# Patient Record
(demographics unavailable — no encounter records)

---

## 2022-08-01 LAB
ALBUMIN SERPL-MCNC: 3.7 G/DL (ref 3.5–5.2)
ALBUMIN/GLOB SERPL: 1 {RATIO} (ref 1–2.7)
ALP SERPL-CCNC: 96 UNIT/L (ref 40–130)
ALT SERPL-CCNC: 53 UNIT/L (ref 0–50)
ANION GAP SERPL CALC-SCNC: 12 MMOL/L (ref 2–17)
AST SERPL-CCNC: 35 UNIT/L (ref 0–50)
BASOPHILS # BLD AUTO: 0.1 X10E3/MCL (ref 0–0.2)
BASOPHILS NFR BLD AUTO: 0.7 % (ref 0–2)
BILIRUB SERPL-MCNC: 0.3 MG/DL (ref 0–1.2)
BUN SERPL-MCNC: 6 MG/DL (ref 6–20)
CALCIUM SERPL-MCNC: 9.2 MG/DL (ref 8.6–10)
CHLORIDE SERPL-SCNC: 89 MMOL/L (ref 98–107)
CREAT SERPL-MCNC: 0.6 MG/DL (ref 0.7–1.3)
DEPRECATED HCO3 PLAS-SCNC: 28 MMOL/L (ref 22–29)
EOSINOPHIL # BLD AUTO: 0.2 X10E3/MCL (ref 0–0.5)
EOSINOPHIL NFR BLD AUTO: 2 % (ref 0–7)
ERYTHROCYTE [DISTWIDTH] IN BLOOD BY AUTOMATED COUNT: 14.4 % (ref 11–16)
GFR SERPL CREATININE-BSD FRML MDRD: 117 ML/MIN/1.73M²
GLOBULIN SER CALC-MCNC: 3.4 G/DL (ref 1.9–4.4)
GLUCOSE SERPL-MCNC: 93 MG/DL (ref 70–99)
HCT VFR BLD AUTO: 36.4 % (ref 38–52)
HGB BLD-MCNC: 12.1 G/DL (ref 13–17.3)
IMMATURE GRANS (ABS): 0.03 X10E3/MCL (ref 0–0.06)
IMMATURE GRANULOCYTES: 0.3 % (ref 0–0.6)
LYMPHOCYTES # SPEC AUTO: 2.6 X10E3/MCL (ref 1–3.2)
LYMPHOCYTES NFR BLD AUTO: 24.4 % (ref 15–45)
MAGNESIUM SERPL-MCNC: 1.8 MG/DL (ref 1.6–2.6)
MCH RBC QN AUTO: 29.4 PG (ref 27–34.5)
MCHC RBC AUTO-ENTMCNC: 33.2 G/DL (ref 32–36)
MCV RBC AUTO: 88.3 FL (ref 84–100)
MONOCYTES # BLD AUTO: 0.6 X10E3/MCL (ref 0.3–1)
MONOCYTES NFR BLD AUTO: 5.8 % (ref 4–12)
NEUTROPHILS # BLD AUTO: 7.2 X10E3/MCL (ref 1.6–7.3)
NEUTROPHILS NFR BLD AUTO: 66.8 % (ref 42–74)
OSMOLALITY SERPL CALC.SUM OF ELEC: 258 MOSM/KG (ref 270–287)
PLATELET # BLD AUTO: 371 X10E3/MCL (ref 140–440)
PMV BLD AUTO: 9.5 FL (ref 7.2–13.2)
POTASSIUM SERPL-SCNC: 3.7 MMOL/L (ref 3.5–5.3)
PROT SERPL-MCNC: 7.2 G/DL (ref 6.4–8.3)
RBC # BLD AUTO: 4.12 X10E6/MCL (ref 4–5.6)
SODIUM SERPL-SCNC: 130 MMOL/L (ref 135–145)
T4 FREE SERPL-MCNC: 0.75 NG/DL (ref 0.82–1.7)
TSH SERPL DL<=0.05 MIU/L-ACNC: 26.06 MCIU/ML (ref 0.36–3.74)
WBC # BLD AUTO: 10.8 X10E3/MCL (ref 3.8–10.6)

## 2022-10-21 NOTE — DISCHARGE SUMMARY
Blood, Stat, ST - Stat, 08/01/22 19:50:00 EDT, 08/01/22 19:50:00 EDT, Nurse Trang dale, Print label Y/N   CMP Completed Blood, Stat, ST - Stat, 08/01/22 19:50:00 EDT, 08/01/22 19:50:00 EDT, Nurse Trang dale, Print label Y/N   Mg Completed Blood, Stat, ST - Stat, 08/01/22 19:50:00 EDT, 08/01/22 19:50:00 EDT, Nurse Trang dale, Print label Y/N   T4 Free Completed Blood, Stat, ST - Stat, 08/01/22 19:50:00 EDT, 08/01/22 19:50:00 EDT, Nurse Trang dale, Print label Y/N   TSH Completed Blood, Stat, ST - Stat, 08/01/22 19:50:00 EDT, 08/01/22 19:50:00 EDT, Trang Greene, Print label Y/N               Radiology Orders  No radiology orders were placed.               Patient Care Orders  Name Status Details   Discharge Patient Ordered 08/01/22 21:21:00 EDT   ED Assessment Adult Completed 08/01/22 18:55:38 EDT, 08/01/22 18:55:38 EDT   ED Secondary Triage Completed 08/01/22 18:55:38 EDT, 08/01/22 18:55:38 EDT   ED Triage Adult Completed 08/01/22 18:25:29 EDT, 08/01/22 18:25:29 EDT   Saline Lock Insert Completed 08/01/22 19:50:00 EDT, Once, 08/01/22 19:50:00 EDT             PROVIDER INFORMATION               Provider Role Assigned Ameya WILSON ED Provider 8/1/2022 19:47:20    Netta Cruz, RN, Paty Lang ED Nurse 8/1/2022 20:03:11        Attending Physician:  Trang WILSON     Admit Doc  DARIUSMAREK     Consulting Doc       VITALS INFORMATION  Vital Sign Triage Latest   Temp Oral ORAL_1%>36.5 degC ORAL%>36.5 degC   Temp Temporal TEMPORAL_1%> TEMPORAL%>   Temp Intravascular INTRAVASCULAR_1%> INTRAVASCULAR%>   Temp Axillary AXILLARY_1%> AXILLARY%>   Temp Rectal RECTAL_1%> RECTAL%>   02 Sat 98 % 98 %   Respiratory Rate RATE_1%>18 br/min RATE%>18 br/min   Peripheral Pulse Rate PULSE RATE_1%> PULSE RATE%>   Apical Heart Rate HEART RATE_1%> HEART RATE%>   Blood Pressure BLOOD PRESSURE_1%>/ BLOOD PRESSURE_1%>86 mmHg BLOOD PRESSURE%>126 mmHg / BLOOD PRESSURE%>86 mmHg                 Immunizations      No Immunizations Documented This Visit          DISCHARGE INFORMATION   Discharge Disposition: H Outpt-Sent Home   Discharge Location:    Home   Discharge Date and Time:    2022 21:59:27   ED Checkout Date and Time:    2022 21:59:27     DEPART REASON INCOMPLETE INFORMATION               Depart Action Incomplete Reason   Interactive View/I&O Recently assessed               Problems      Active           Brain metastasis          Lung mass              Smoking Status      10 or more cigarettes (1/2 pack or more)/day in last 30 days         PATIENT EDUCATION INFORMATION  Instructions:       Hypothyroidism; Hyponatremia     Follow up:                    With: Address: When:   All your oncologist tomorrow concerning your low thyroid level. Return for fevers chills inability take fluids or any concerns. ED PROVIDER DOCUMENTATION     Patient:   Renu Zhao             MRN: [id number]            FIN: [id number]               Age:   46 years     Sex:  Male     :  1971   Associated Diagnoses:   Hyponatremia; Hypothyroidism   Author:   Norma WILSON      Basic Information   Time seen: Provider Seen (ST)   ED Provider/Time:    Norma WILSON / 2022 19:47  . Additional information: Chief Complaint from Nursing Triage Note   Chief Complaint  Chief Complaint: Pt c/o nausea, diarrhea, weakness, headache and on chemotherapy. Pt rpts high thyroid numbers due to treatment before and feels the same way now. (22 18:52:00). History of Present Illness   The patient presents with nausea and 77-year-old male history of metastatic lung cancer mets to the brain he is getting [facility] presents here because of generalized malaise today does not feeling well no energy some nausea.   Concerned that his thyroid might be acting up other than the past.  No vomiting but and he has had tablet: 20 mg, 1 tabs, Oral, Daily, 0 Refill(s). Past Medical/ Family/ Social History   Medical history: Reviewed as documented in chart. Surgical history: Reviewed as documented in chart. Family history: Not significant. Social history: Reviewed as documented in chart. Problem list:    Active Problems (3)  Brain metastasis   Lung mass   Seizure   . Physical Examination               Vital Signs   Vital Signs   7/3/0438 30:56 EDT Systolic Blood Pressure 618 mmHg    Diastolic Blood Pressure 86 mmHg    Temperature Oral 36.5 degC    Heart Rate Monitored 64 bpm    Respiratory Rate 18 br/min    SpO2 98 %   . Measurements   8/1/2022 18:55 EDT Body Mass Index est madiha 26.70 kg/m2    Body Mass Index Measured 26.70 kg/m2   8/1/2022 18:52 EDT Height/Length Measured 172 cm    Weight Dosing 79 kg   . Basic Oxygen Information   8/1/2022 20:11 EDT Oxygen Therapy Room air   8/1/2022 18:52 EDT Oxygen Therapy Room air    SpO2 98 %   . General:  Alert, no acute distress. Skin:  Warm. Head:  Normocephalic. Neck:  Trachea midline. Eye:  Normal conjunctiva. Ears, nose, mouth and throat:  Oral mucosa moist.   Cardiovascular:  Regular rate and rhythm. Respiratory:  Lungs are clear to auscultation, respirations are non-labored, breath sounds are equal.    Gastrointestinal:  Soft, Nontender, Non distended. Neurological:  Alert and oriented to person, place, time, and situation, No focal neurological deficit observed. Lymphatics:  No lymphadenopathy. Psychiatric:  Cooperative, appropriate mood & affect. Medical Decision Making   Rationale:  Past medical and surgical history reviewed by me and also meds and allergies. Documents reviewed:  Emergency department nurses' notes.    Results review:  Lab results : Lab View   8/1/2022 20:32 EDT Estimated Creatinine Clearance 139.57 mL/min   8/1/2022 20:10 EDT WBC 10.8 x10e3/mcL  HI    RBC 4.12 x10e6/mcL    Hgb 12.1 g/dL  LOW    HCT 36.4 %  LOW MCV 88.3 fL    MCH 29.4 pg    MCHC 33.2 g/dL    RDW 14.4 %    Platelet 860 [id number]/XPU    MPV 9.5 fL    Neutro Auto 66.8 %    Neutro Absolute 7.2 x10e3/mcL    Immature Grans Percent 0.3 %    Immature Grans Absolute 0.03 x10e3/mcL    Lymph Auto 24.4 %    Lymph Absolute 2.6 x10e3/mcL    Mono Auto 5.8 %    Mono Absolute 0.6 x10e3/mcL    Eosinophil Percent 2.0 %    Eos Absolute 0.2 x10e3/mcL    Basophil Auto 0.7 %    Baso Absolute 0.1 x10e3/mcL    Sodium Lvl 130 mmol/L  LOW    Potassium Lvl 3.7 mmol/L    Chloride 89 mmol/L  LOW    CO2 28 mmol/L    Glucose Random 93 mg/dL    BUN 6 mg/dL    Creatinine Lvl 0.6 mg/dL  LOW    AGAP 12 mmol/L    Osmolality Calc 258 mOsm/kg  LOW    Calcium Lvl 9.2 mg/dL    Protein Total 7.2 g/dL    Albumin Lvl 3.7 g/dL    Globulin Calc 3.4 g/dL    AG Ratio Calc 1.00    Alk Phos 96 unit/L    AST 35 unit/L    ALT 53 unit/L  HI    eGFR 117 mL/min/1.73mÂ²    Magnesium Lvl 1.8 mg/dL    Bili Total 0.30 mg/dL    T4 Free 0.75 ng/dL  LOW   8/1/2022 18:55 EDT Estimated Creatinine Clearance 139.57 mL/min   , Interpretation Mild hyponatremia. Reexamination/ Reevaluation   Vital signs   Basic Oxygen Information   8/1/2022 20:11 EDT Oxygen Therapy Room air   8/1/2022 18:52 EDT Oxygen Therapy Room air    SpO2 98 %         Impression and Plan   Diagnosis   Hyponatremia (QFG37-AD E87.1, Discharge, Medical)   Hyponatremia (JIP19-CV E87.1, Discharge, Medical)   Hypothyroidism (USX40-SU E03.9, Discharge, Medical)   Plan   Condition: Stable. Patient was given the following educational materials: Hyponatremia, Hypothyroidism, Hypothyroidism, Hyponatremia. Follow up with: All your oncologist tomorrow concerning your low thyroid level. Return for fevers chills inability take fluids or any concerns. .    Counseled: Patient, Family.

## 2022-10-21 NOTE — ED NOTES
ED Triage Note       ED Triage Adult Entered On:  8/1/2022 18:55 EDT    Performed On:  8/1/2022 18:52 EDT by Yvette Negrete RN, [facility]               Triage   Numeric Rating Pain Scale :   8   Chief Complaint :   Pt c/o nausea, diarrhea, weakness, headache and on chemotherapy. Pt rpts high thyroid numbers due to treatment before and feels the same way now.    [place] [facility] Mode of Arrival :   Private vehicle   Infectious Disease Documentation :   Document assessment   Patient received chemo or biotherapy last 48 hrs? :   No   Temperature Oral :   36.5 degC(Converted to: 97.7 degF)    Heart Rate Monitored :   64 bpm   Respiratory Rate :   18 br/min   Systolic Blood Pressure :   776 mmHg   Diastolic Blood Pressure :   86 mmHg   SpO2 :   98 %   Oxygen Therapy :   Room air   Patient presentation :   None of the above   Chief Complaint or Presentation suggest infection :   No   Weight Dosing :   79 kg(Converted to: 174 lb 3 oz)    Height :   172 cm(Converted to: 5 ft 8 in)    Body Mass Index Dosing :   27 kg/m2   Juan Calero - 8/1/2022 18:52 EDT   DCP GENERIC CODE   Tracking Acuity :   3   Tracking Group :   ED [facility] Tracking [facility]   Yvette Negrete [place] - 8/1/2022 18:52 EDT   ED General Section :   Document assessment   Pregnancy Status :   N/A   ED Allergies Section :   Document assessment   ED Reason for Visit Section :   Document assessment   Juan Calero - 8/1/2022 18:52 EDT   ID Risk Screen Symptoms   Recent Travel History :   No recent travel   TB Symptom Screen :   No symptoms   Last 90 days COVID-19 ID :   No   Close Contact with COVID-19 ID :   No   Last 14 days COVID-19 ID :   No   C. diff Symptom/History ID :   Neither of the above   Patient Pregnant :   None of the above   MRSA/VRE Screening :   None of these apply   CRE Screening :   No   Juan Calero - 8/1/2022 18:52 EDT   Allergies   (As Of: 8/1/2022 18:55:37 EDT)   Allergies (Active)   No Known Medication Allergies  Estimated Onset Date:   Unspecified ; Created By:   Jayden Allred; Reaction Status:   Active ; Category:   Drug ; Substance:   No Known Medication Allergies ; Type: Allergy ; Updated By:   Jayden Allred; Reviewed Date:   11/17/2021 8:16 EST        Psycho-Social   Last 3 mo, thoughts killing self/others :   Patient denies   Right click within box for Suspected Abuse policy link. :   None   Feels Safe Where Live :   Yes   ED Behavioral Activity Rating Scale :   4 - Quiet and awake (normal level of activity)   Jose Dean - 8/1/2022 18:52 EDT   ED Reason for Visit   (As Of: 8/1/2022 18:55:37 EDT)   Problems(Active)    Brain metastasis (SNOMED CT  :357818463 )  Name of Problem:   Brain metastasis ; Recorder:   Evonne GAMBINO; Confirmation:   Confirmed ; Classification:   Medical ; Code:   [id number] ; Contributor System:   PiCloud ; Last Updated:   11/11/2021 14:54 EST ; Life Cycle Date:   11/11/2021 ; Life Cycle Status:   Active ; Vocabulary:   SNOMED CT        Lung mass (SNOMED CT  :833744531 )  Name of Problem:   Lung mass ; Recorder:   Alea MOLINA; Confirmation:   Confirmed ; Classification:   Medical ; Code:   [id number] ; Contributor System:   PowerChart ; Last Updated:   10/20/2021 11:12 EDT ; Life Cycle Status:   Active ; Responsible Provider:   Alea MOLINA; Vocabulary:   SNOMED CT        Seizure (SNOMED CT  :771027063 )  Name of Problem:   Seizure ; Recorder:   Beau Valadez; Confirmation:   Confirmed ; Classification:   Patient Stated ; Code:   [id number] ; Contributor System:   PowerChart ; Last Updated:   11/10/2021 15:57 EST ;  Life Cycle Date:   11/10/2021 ; Life Cycle Status:   Active ; Vocabulary:   SNOMED CT          Diagnoses(Active)    Nausea  Date:   8/1/2022 ; Diagnosis Type:   Reason For Visit ; Confirmation:   Complaint of ; Clinical Dx:   Nausea ; Classification:   Medical ; Clinical Service:   Emergency medicine ; Code:   PNED ; Probability:   0 ; Diagnosis Code:

## 2022-10-21 NOTE — ED NOTES
ED Patient Education Note     Patient Education Materials Follows:  Endocrinology     Hypothyroidism      Hypothyroidism is when the thyroid gland does not make enough of certain hormones (it is underactive). The thyroid gland is a small gland located in the lower front part of the neck, just in front of the windpipe (trachea). This gland makes hormones that help control how the body uses food for energy (metabolism) as well as how the heart and brain function. These hormones also play a role in keeping your bones strong. When the thyroid is underactive, it produces too little of the hormones thyroxine (T4) and triiodothyronine (T3). What are the causes? This condition may be caused by:   Hashimoto's disease. This is a disease in which the body's disease-fighting system (immune system) attacks the thyroid gland. This is the most common cause. Viral infections. Pregnancy. Certain medicines. Birth defects. Past radiation treatments to the head or neck for cancer. Past treatment with radioactive iodine. Past exposure to radiation in the environment. Past surgical removal of part or all of the thyroid. Problems with a gland in the center of the brain (pituitary gland). Lack of enough iodine in the diet. What increases the risk? You are more likely to develop this condition if:   You are female. You have a family history of thyroid conditions. You use a medicine called lithium. You take medicines that affect the immune system (immunosuppressants). What are the signs or symptoms? Symptoms of this condition include:   Feeling as though you have no energy (lethargy). Not being able to tolerate cold. Weight gain that is not explained by a change in diet or exercise habits. Lack of appetite. Dry skin. Coarse hair. Menstrual irregularity. Slowing of thought processes. Constipation. Sadness or depression. How is this diagnosed? This condition may be diagnosed based on: Your symptoms, your medical history, and a physical exam.     Blood tests. You may also have imaging tests, such as an ultrasound or MRI. How is this treated? This condition is treated with medicine that replaces the thyroid hormones that your body does not make. After you begin treatment, it may take several weeks for symptoms to go away. Follow these instructions at home:     Take over-the-counter and prescription medicines only as told by your health care provider. If you start taking any new medicines, tell your health care provider. Keep all follow-up visits as told by your health care provider. This is important. ? As your condition improves, your dosage of thyroid hormone medicine may change. ? You will need to have blood tests regularly so that your health care provider can monitor your condition. Contact a health care provider if:     Your symptoms do not get better with treatment. You are taking thyroid replacement medicine and you:  ? Sweat a lot. ? Have tremors. ? Feel anxious. ? Lose weight rapidly. ? Cannot tolerate heat.    ? Have emotional swings. ? Have diarrhea. ? Feel weak. Get help right away if you have:     Chest pain. An irregular heartbeat. A rapid heartbeat. Difficulty breathing. Summary     Hypothyroidism is when the thyroid gland does not make enough of certain hormones (it is underactive). When the thyroid is underactive, it produces too little of the hormones thyroxine (T4) and triiodothyronine (T3). The most common cause is Hashimoto's disease, a disease in which the body's disease-fighting system (immune system) attacks the thyroid gland. The condition can also be caused by viral infections, medicine, pregnancy, or past radiation treatment to the head or neck.      Symptoms may include weight gain, dry skin, constipation, tired (lethargic). Muscle weakness and cramping. Loss of appetite. Feeling weak or light-headed. Severe symptoms of this condition include:   Confusion. Agitation. Having a rapid heart rate. Passing out (fainting). Seizures. Coma. How is this diagnosed? This condition is diagnosed based on:   A physical exam.     Your medical history. Tests, including:  ? Blood tests. ? Urine tests. How is this treated? Treatment for this condition depends on the cause. Treatment may include:   Getting fluids through an IV that is inserted into one of your veins. Medicines to correct the sodium imbalance. If medicines are causing the condition, the medicines will need to be adjusted. Limiting your water or fluid intake to get the correct sodium balance. Monitoring in the hospital setting to closely watch your symptoms for improvement. Follow these instructions at home:       Take over-the-counter and prescription medicines only as told by your health care provider. Many medicines can make this condition worse. Talk with your health care provider about any medicines that you are currently taking. Carefully follow a recommended diet as told by your health care provider. Carefully follow instructions from your health care provider about fluid restrictions. Do not drink alcohol. Keep all follow-up visits as told by your health care provider. This is important. Contact a health care provider if:     You develop worsening nausea, fatigue, headache, confusion, or weakness. Your symptoms go away and then return. You have problems following the recommended diet. Get help right away if:     You have a seizure. You pass out. You have ongoing diarrhea or vomiting. Summary     Hyponatremia is when the amount of salt (sodium) in your blood is too low.      When sodium levels are low, your cells absorb extra water, which causes them to swell. The swelling happens throughout the body, but it mostly affects the brain. Treatment for this condition depends on the cause. It may include IV fluids, medicines, and limiting your fluid intake. This information is not intended to replace advice given to you by your health care provider. Make sure you discuss any questions you have with your health care provider. Document Revised: 11/01/2019 Document Reviewed: 11/01/2019  Elsevier Patient Education ?  [address] [place].

## 2022-10-21 NOTE — ED PROVIDER NOTES
Nausea        Patient:   Teresa Murray             MRN: [id number]            FIN: [id number]               Age:   46 years     Sex:  Male     :  1971   Associated Diagnoses:   Hyponatremia; Hypothyroidism   Author:   Ry WILSON      Basic Information   Time seen: Provider Seen (ST)   ED Provider/Time:    Ry WILSON / 2022 19:47  . Additional information: Chief Complaint from Nursing Triage Note   Chief Complaint  Chief Complaint: Pt c/o nausea, diarrhea, weakness, headache and on chemotherapy. Pt rpts high thyroid numbers due to treatment before and feels the same way now. (22 18:52:00). History of Present Illness   The patient presents with nausea and 59-year-old male history of metastatic lung cancer mets to the brain he is getting Koko Pandramírez presents here because of generalized malaise today does not feeling well no energy some nausea. Concerned that his thyroid might be acting up other than the past.  No vomiting but and he has had chronic diarrhea since beginning chemo and that is unchanged. No chest pain or shortness of breath he is also got a typical headache achy throbbing that is typical for him and not sudden or acutely worsen. No altered mental status no trauma. No fever no rash. Patient is also concerned about his thyroid levels because of past issues symptoms worse with exertion better with. The onset was 2  days ago. The course/duration of symptoms is fluctuating in intensity. The degree at present is none. The exacerbating factor is none. The relieving factor is none. Risk factors consist of recent chemotherapy. Review of Systems   Constitutional symptoms:  Generalized weakness. Additional review of systems information: All other systems reviewed and otherwise negative. Health Status   Allergies: Allergic Reactions (Selected)  No Known Medication Allergies.    Medications:  (Selected)   Inpatient Medications  Future  LORazepam: 0.5 mg, 0.25 mL, IV Push, q6hr-Day of Tx, Day 1, PRN: other (see comment)  Zero Time Placeholder: 1 mL, Day of Tx, Day 1  pembrolizumab INF: 200 mg, 8 mL, IV Piggyback, Day of Tx, Day 1  promethazine IV range dose - High Alert: 25 mg, 1 mL, IV Push, q6hr-Day of Tx, Day 1, PRN: nausea/vomiting  Documented Medications  Documented  HYDROcodone-acetaminophen 5 mg-325 mg oral tablet: 1 tabs, Oral, q6hr, PRN: moderate pain (4-7), 0 Refill(s)  Keppra 1000 mg oral tablet: 2,000 mg, 2 tabs, Oral, BID, 0 Refill(s)  Vimpat 100 mg oral tablet: 100 mg, 1 tabs, Oral, BID, 0 Refill(s)  acetaminophen 325 mg oral tablet: 325 mg, 1 tabs, Oral, q6hr, not to exceed 4000 mg/day, PRN: mild pain (1-3), 0 Refill(s)  dexAMETHasone 4 mg oral tablet: 4 mg, 1 tabs, Oral, q6hr, follow steroid taper in chart, take famotidine daily while taking steroids. Take steroids with food. , 0 Refill(s)  docusate-senna 50 mg-8.6 mg oral tablet: 1 tabs, Oral, BID, PRN: constipation, 0 Refill(s)  famotidine 20 mg oral tablet: 20 mg, 1 tabs, Oral, Daily, 0 Refill(s). Past Medical/ Family/ Social History   Medical history: Reviewed as documented in chart. Surgical history: Reviewed as documented in chart. Family history: Not significant. Social history: Reviewed as documented in chart. Problem list:    Active Problems (3)  Brain metastasis   Lung mass   Seizure   . Physical Examination               Vital Signs   Vital Signs   9/3/6805 73:59 EDT Systolic Blood Pressure 374 mmHg    Diastolic Blood Pressure 86 mmHg    Temperature Oral 36.5 degC    Heart Rate Monitored 64 bpm    Respiratory Rate 18 br/min    SpO2 98 %   . Measurements   8/1/2022 18:55 EDT Body Mass Index est madiha 26.70 kg/m2    Body Mass Index Measured 26.70 kg/m2   8/1/2022 18:52 EDT Height/Length Measured 172 cm    Weight Dosing 79 kg   .    Basic Oxygen Information   8/1/2022 20:11 EDT Oxygen Therapy Room air   8/1/2022 18:52 EDT Oxygen Therapy Room air    SpO2 98 %   . General:  Alert, no acute distress. Skin:  Warm. Head:  Normocephalic. Neck:  Trachea midline. Eye:  Normal conjunctiva. Ears, nose, mouth and throat:  Oral mucosa moist.   Cardiovascular:  Regular rate and rhythm. Respiratory:  Lungs are clear to auscultation, respirations are non-labored, breath sounds are equal.    Gastrointestinal:  Soft, Nontender, Non distended. Neurological:  Alert and oriented to person, place, time, and situation, No focal neurological deficit observed. Lymphatics:  No lymphadenopathy. Psychiatric:  Cooperative, appropriate mood & affect. Medical Decision Making   Rationale:  Past medical and surgical history reviewed by me and also meds and allergies. Documents reviewed:  Emergency department nurses' notes.    Results review:  Lab results : Lab View   8/1/2022 20:32 EDT Estimated Creatinine Clearance 139.57 mL/min   8/1/2022 20:10 EDT WBC 10.8 x10e3/mcL  HI    RBC 4.12 x10e6/mcL    Hgb 12.1 g/dL  LOW    HCT 36.4 %  LOW    MCV 88.3 fL    MCH 29.4 pg    MCHC 33.2 g/dL    RDW 14.4 %    Platelet 992 Q13R7/LAJ    MPV 9.5 fL    Neutro Auto 66.8 %    Neutro Absolute 7.2 x10e3/mcL    Immature Grans Percent 0.3 %    Immature Grans Absolute 0.03 x10e3/mcL    Lymph Auto 24.4 %    Lymph Absolute 2.6 x10e3/mcL    Mono Auto 5.8 %    Mono Absolute 0.6 x10e3/mcL    Eosinophil Percent 2.0 %    Eos Absolute 0.2 x10e3/mcL    Basophil Auto 0.7 %    Baso Absolute 0.1 x10e3/mcL    Sodium Lvl 130 mmol/L  LOW    Potassium Lvl 3.7 mmol/L    Chloride 89 mmol/L  LOW    CO2 28 mmol/L    Glucose Random 93 mg/dL    BUN 6 mg/dL    Creatinine Lvl 0.6 mg/dL  LOW    AGAP 12 mmol/L    Osmolality Calc 258 mOsm/kg  LOW    Calcium Lvl 9.2 mg/dL    Protein Total 7.2 g/dL    Albumin Lvl 3.7 g/dL    Globulin Calc 3.4 g/dL    AG Ratio Calc 1.00    Alk Phos 96 unit/L    AST 35 unit/L    ALT 53 unit/L  HI    eGFR 117 mL/min/1.73mÂ²    Magnesium Lvl 1.8 mg/dL    Bili Total 0.30 mg/dL    T4 Free 0.75 ng/dL  LOW   8/1/2022 18:55 EDT Estimated Creatinine Clearance 139.57 mL/min   , Interpretation Mild hyponatremia. Reexamination/ Reevaluation   Vital signs   Basic Oxygen Information   8/1/2022 20:11 EDT Oxygen Therapy Room air   8/1/2022 18:52 EDT Oxygen Therapy Room air    SpO2 98 %         Impression and Plan   Diagnosis   Hyponatremia (XPQ94-QG E87.1, Discharge, Medical)   Hyponatremia (JXZ25-CF E87.1, Discharge, Medical)   Hypothyroidism (QTI38-QF E03.9, Discharge, Medical)   Plan   Condition: Stable. Patient was given the following educational materials: Hyponatremia, Hypothyroidism, Hypothyroidism, Hyponatremia. Follow up with: All your oncologist tomorrow concerning your low thyroid level. Return for fevers chills inability take fluids or any concerns. .    Counseled: Patient, Family.     Signature Line     Electronically Signed on 08/01/2022 09:21 PM EDT   ________________________________________________   Lexus WILSON               Modified by: Lexus WILSON on 08/01/2022 08:18 PM EDT      Modified by: Lexus WILSON on 08/01/2022 08:54 PM EDT      Modified by: Lexus WILSON on 08/01/2022 09:19 PM EDT      Modified by: Lexus WILSON on 08/01/2022 09:19 PM EDT      Modified by: Lexus WILSON on 08/01/2022 09:20 PM EDT      Modified by: Lexus WILSON on 08/01/2022 09:21 PM EDT

## 2022-10-21 NOTE — ED NOTES
ED Patient Summary       ;          [facility]  [address], [place], [address] [place] [address]  [phone]  Discharge Instructions (Patient)  Anabelle Chadwick  :  1971                   MRN: [id number]                   FIN: [id number]  Reason For Visit: Nausea; CHEMO/NAUSEA/WEAK/  Final Diagnosis: Hyponatremia; Hypothyroidism     Visit Date: 2022 18:25:00  Address: [address] [place] [id number]  Phone: [phone]     Emergency Department Providers:         Primary Physician:      Chasidy MCCOY [facility]okter [facility] would like to thank you for allowing us to assist you with your healthcare needs. The following includes patient education materials and information regarding your injury/illness. Follow-up Instructions: You were seen today on an emergency basis. Please contact your primary care doctor for a follow up appointment. If you received a referral to a specialist doctor, it is important you follow-up as instructed. It is important that you call your follow-up doctor to schedule and confirm the location of your next appointment. Your doctor may practice at multiple locations. The office location of your follow-up appointment may be different to the one written on your discharge instructions. If you do not have a primary care doctor, please call [phone] for help in finding a [facility] Provider. For help in finding a specialist doctor, please call [phone]. If your condition gets worse before your follow-up with your primary care doctor or specialist, please return to the Emergency Department. Coronavirus 2019 (COVID-19) Reminders:     Patients age 15 - 24, with parental consent, and over age 25 can make an appointment for a COVID-19 vaccine. Patients can contact their [address] [facility] doctors' offices to schedule an appointment to receive the COVID-19 vaccine.  Patients who do not have a [facility] physician can call [phone] to schedule vaccination appointments. Follow Up Appointments:  Primary Care Provider:     Name: Catarino Dalton     Phone: [phone]                 With: Address: When:   All your oncologist tomorrow concerning your low thyroid level. Return for fevers chills inability take fluids or any concerns. [address] SERVICES%>             Medications that have not changed  Other Medications  acetaminophen (acetaminophen 325 mg oral tablet) 1 Tabs Oral (given by mouth) every 6 hours as needed mild pain (1-3). not to exceed 4000 mg/day. Last Dose:____________________  dexAMETHasone (dexAMETHasone 4 mg oral tablet) 1 Tabs Oral (given by mouth) every 6 hours. follow steroid taper in chart, take famotidine daily while taking steroids. Take steroids with food. .  Last Dose:____________________  docusate-senna (docusate-senna 50 mg-8.6 mg oral tablet) 1 Tabs Oral (given by mouth) 2 times a day as needed constipation. Last Dose:____________________  famotidine (famotidine 20 mg oral tablet) 1 Tabs Oral (given by mouth) every day. Last Dose:____________________  HYDROcodone-acetaminophen (HYDROcodone-acetaminophen 5 mg-325 mg oral tablet) 1 Tabs Oral (given by mouth) every 6 hours as needed moderate pain (4-7). Last Dose:____________________  lacosamide (Vimpat 100 mg oral tablet) 1 Tabs Oral (given by mouth) 2 times a day. Last Dose:____________________  levETIRAcetam (Keppra 1000 mg oral tablet) 2 Tabs Oral (given by mouth) 2 times a day. Last Dose:____________________      Allergy Info: No Known Medication Allergies     Discharge Additional Information          Discharge Patient 08/01/22 21:21:00 EDT      Patient Education Materials:        Hypothyroidism      Hypothyroidism is when the thyroid gland does not make enough of certain hormones (it is underactive).  The thyroid gland is a small gland located in the lower front part of the neck, just in front of the windpipe (trachea). This gland makes hormones that help control how the body uses food for energy (metabolism) as well as how the heart and brain function. These hormones also play a role in keeping your bones strong. When the thyroid is underactive, it produces too little of the hormones thyroxine (T4) and triiodothyronine (T3). What are the causes? This condition may be caused by:   Hashimoto's disease. This is a disease in which the body's disease-fighting system (immune system) attacks the thyroid gland. This is the most common cause. Viral infections. Pregnancy. Certain medicines. Birth defects. Past radiation treatments to the head or neck for cancer. Past treatment with radioactive iodine. Past exposure to radiation in the environment. Past surgical removal of part or all of the thyroid. Problems with a gland in the center of the brain (pituitary gland). Lack of enough iodine in the diet. What increases the risk? You are more likely to develop this condition if:   You are female. You have a family history of thyroid conditions. You use a medicine called lithium. You take medicines that affect the immune system (immunosuppressants). What are the signs or symptoms? Symptoms of this condition include:   Feeling as though you have no energy (lethargy). Not being able to tolerate cold. Weight gain that is not explained by a change in diet or exercise habits. Lack of appetite. Dry skin. Coarse hair. Menstrual irregularity. Slowing of thought processes. Constipation. Sadness or depression. How is this diagnosed? This condition may be diagnosed based on: Your symptoms, your medical history, and a physical exam.     Blood tests. You may also have imaging tests, such as an ultrasound or MRI. How is this treated?     This condition is treated with medicine that replaces the thyroid hormones that your body does not make. After you begin treatment, it may take several weeks for symptoms to go away. Follow these instructions at home:     Take over-the-counter and prescription medicines only as told by your health care provider. If you start taking any new medicines, tell your health care provider. Keep all follow-up visits as told by your health care provider. This is important. ? As your condition improves, your dosage of thyroid hormone medicine may change. ? You will need to have blood tests regularly so that your health care provider can monitor your condition. Contact a health care provider if:     Your symptoms do not get better with treatment. You are taking thyroid replacement medicine and you:  ? Sweat a lot. ? Have tremors. ? Feel anxious. ? Lose weight rapidly. ? Cannot tolerate heat.    ? Have emotional swings. ? Have diarrhea. ? Feel weak. Get help right away if you have:     Chest pain. An irregular heartbeat. A rapid heartbeat. Difficulty breathing. Summary     Hypothyroidism is when the thyroid gland does not make enough of certain hormones (it is underactive). When the thyroid is underactive, it produces too little of the hormones thyroxine (T4) and triiodothyronine (T3). The most common cause is Hashimoto's disease, a disease in which the body's disease-fighting system (immune system) attacks the thyroid gland. The condition can also be caused by viral infections, medicine, pregnancy, or past radiation treatment to the head or neck. Symptoms may include weight gain, dry skin, constipation, feeling as though you do not have energy, and not being able to tolerate cold. This condition is treated with medicine to replace the thyroid hormones that your body does not make.       This information is not intended to replace advice given to you by your health care provider. Make sure you discuss any questions you have with your health care provider. Document Revised: 11/30/2018 Document Reviewed: 11/28/2018  [employer] Patient Education ? [address].       Hyponatremia    Hyponatremia is when the amount of salt (sodium) in your blood is too low. When sodium levels are low, your cells absorb extra water, which causes them to swell. The swelling happens throughout the body, but it mostly affects the brain. What are the causes? This condition may be caused by:   Certain medical conditions, such as:  ? Heart, kidney, or liver problems. ? Thyroid problems. ? Adrenal gland problems. ? Metabolic conditions, such as Baldwin disease or syndrome of inappropriate antidiuretic hormone (SIADH). Severe vomiting or diarrhea. Certain medicines or illegal drugs. Dehydration. Drinking too much water. Eating a diet that is low in sodium. Large burns on your body. Excessive sweating. What increases the risk? You are more likely to develop this condition if you:   Have long-term (chronic) kidney disease. Have heart failure. Have a medical condition that causes frequent or excessive diarrhea. Participate in intense physical activities, such as marathon running. Take certain medicines that affect the sodium and fluid balance in the blood. Some of these medicine types include:  ? Diuretics. ? NSAIDs. ? Some opioid pain medicines. ? Some antidepressants. ? Some seizure prevention medicines. What are the signs or symptoms? Symptoms of this condition include:   Headache. Nausea and vomiting. Being very tired (lethargic). Muscle weakness and cramping. Loss of appetite. Feeling weak or light-headed. Severe symptoms of this condition include:   Confusion. Agitation. Having a rapid heart rate. Passing out (fainting). Seizures. Coma.         How advice given to you by your health care provider. Make sure you discuss any questions you have with your health care provider. Document Revised: 11/01/2019 Document Reviewed: 11/01/2019  [employer] Patient Education ? [id number]      ---------------------------------------------------------------------------------------------------------------------  [facility] allows patients to review your COVID and other test results as well as discharge documents from any [facility], Emergency Department, surgical center or outpatient lab. Test results are typically available 36 hours after the test is completed. [address] encourages you to self-enroll in the [facility] Patient Portal.     To begin your self-enrollment process, please visit [email or web address] Under [facility], click on Sign up now. NOTE: You must be 16 years and older to use [facility] Self-Enroll online. If you are a parent, caregiver, or guardian; you need an invite to access your childs or dependents health records. To obtain an invite, contact the Medical Records department at [phone] Monday through Friday, 8-4:30, select option 3 . If we receive your call afterhours, we will return your call the next business day. If you have issues trying to create or access your account, contact [employer] at [phone] available 7 days a week 24 hours a day.      Comment:

## 2022-10-21 NOTE — ED NOTES
ED Triage Note       ED Secondary Triage Entered On:  8/1/2022 20:11 EDT    Performed On:  8/1/2022 20:10 EDT by Rubina Hill RN, Luis Felipe Edgar               General Information   Barriers to Learning :   None evident   Languages :   English   Pt. Currently Receiving Radiation :   No   COVID-19 Vaccine Status :   Not received   ED Home Meds Section :   Document assessment   [facility] ED Fall Risk Section :   Document assessment   ED History Section :   Document assessment   ED Advance Directives Section :   Document assessment   ED Palliative Screen :   N/A (prefilled for <66yo)   Jack Gordon RN, Luis Felipe Edgar - 8/1/2022 20:10 EDT   (As Of: 8/1/2022 20:11:02 EDT)   Problems(Active)    Brain metastasis (SNOMED CT  :017674272 )  Name of Problem:   Brain metastasis ; Recorder:   Jack GAMBINO; Confirmation:   Confirmed ; Classification:   Medical ; Code:   [id number] ; Contributor System:   Mashed Pixel ; Last Updated:   11/11/2021 14:54 EST ; Life Cycle Date:   11/11/2021 ; Life Cycle Status:   Active ; Vocabulary:   SNOMED CT        Lung mass (SNOMED CT  :[id number] )  Name of Problem:   Lung mass ; Recorder:   Phill MOLINA; Confirmation:   Confirmed ; Classification:   Medical ; Code:   [id number] ; Contributor System:   PowerChart ; Last Updated:   10/20/2021 11:12 EDT ; Life Cycle Status:   Active ; Responsible Provider:   Phill MOLINA; Vocabulary:   SNOMED CT        Seizure (SNOMED CT  :[id number] )  Name of Problem:   Seizure ; Recorder:   Titi Walker; Confirmation:   Confirmed ; Classification:   Patient Stated ; Code:   [id number] ; Contributor System:   PowerChart ; Last Updated:   11/10/2021 15:57 EST ;  Life Cycle Date:   11/10/2021 ; Life Cycle Status:   Active ; Vocabulary:   SNOMED CT          Diagnoses(Active)    Nausea  Date:   8/1/2022 ; Diagnosis Type:   Reason For Visit ; Confirmation:   Complaint of ; Clinical Dx:   Nausea ; Classification:   Medical ; Clinical Service: Emergency medicine ; Code:   PNED ; Probability:   0 ; Diagnosis Code:   [id number]             -    Procedure History   (As Of: 8/1/2022 20:11:02 EDT)     Anesthesia Minutes:   0 ; Procedure Name:   Lumbar ; Procedure Minutes:   0 ; Last Reviewed Dt/Tm:   8/1/2022 20:10:38 EDT            Procedure Dt/Tm:   10/21/2021 09:00:00 EDT ; Location:    Endoscopy ; Provider:   Perfecto MOLINA; Anesthesia Type:   General ; :   Bertha LEE; Anesthesia Minutes:   0 ; Procedure Name:   Endoscopic Bronchial Ultrasound ; Procedure Minutes:   79 ; Comments:     10/21/2021 10:17 EDT - Dann Gallagher  auto-populated from documented surgical case ; Clinical Service:   Surgery ; Last Reviewed Dt/Tm:   8/1/2022 20:10:38 EDT            Procedure Dt/Tm:   10/21/2021 09:00:00 EDT ; Location:    Endoscopy ; Provider:   Perfecto MOLINA; Anesthesia Type:   General ; :   Bertha LEE; Anesthesia Minutes:   0 ; Procedure Name:   Bronchoscopy Navigational with Biopsy and or Fiducial placement ; Procedure Minutes:   79 ; Comments:     10/21/2021 10:17 EDT - Kurt Friedman from documented surgical case ; Clinical Service:   Surgery ; Last Reviewed Dt/Tm:   8/1/2022 20:10:38 EDT            Procedure Dt/Tm:   11/17/2021 11:19:00 EST ; Location:    OR ; Provider:   Marlyn Felix; Anesthesia Type:   General ; :   Ken OATES; Anesthesia Minutes:   0 ; Procedure Name:   Craniotomy Tumor Image Guided ; Procedure Minutes:   92 ; Comments:     11/17/2021 13:08 EST - Nahed Mejia RN, Arnel Orellana  auto-populated from documented surgical case ; Clinical Service:   Surgery ;  Last Reviewed Dt/Tm:   8/1/2022 20:10:38 EDT            [facility] Fall Risk Assessment Tool   Hx of falling last 3 months ED Fall :   No   Patient confused or disoriented ED Fall :   No   Patient intoxicated or sedated ED Fall :   No   Patient impaired gait ED Fall :   No   Use a mobility assistance device ED Fall :   No   Patient altered elimination ED Fall :   No   [facility] ED Fall Score :   0    Lora Orozco - 8/1/2022 20:10 EDT   ED Advance Directive   Advance Directive :   No   Allie Vasquez RN, Lalo Tyler - 8/1/2022 20:10 EDT   Social History   Social History   (As Of: 8/1/2022 20:11:02 EDT)   Tobacco:        Tobacco use: 10 or more cigarettes (1/2 pack or more)/day in last 30 days. Cigarettes, 30 per day. (Last Updated: 10/19/2021 18:00:52 EDT by Lonnie Caballero RN, Gris Diaz)          Electronic Cigarette/Vaping:        Never Electronic Cigarette Use. (Last Updated: 11/12/2021 13:27:29 EST by Gee GAMBINO)          Alcohol:        Current, Beer, Liquor, 1-2 times per month   (Last Updated: 10/19/2021 18:01:12 EDT by Lonnie Caballero RN, NITESH E)          Substance Use:        Opioid Naive - not currently taking opioids, Denies   (Last Updated: 10/19/2021 18:01:23 EDT by Lonnie Caballero RN, NITESH E)            Med Hx   Medication List   (As Of: 8/1/2022 20:11:02 EDT)   Normal Order    Sodium Chloride 0.9% intravenous solution Bolus  :   Sodium Chloride 0.9% intravenous solution Bolus ; Status:   Completed ; Ordered As Mnemonic:   Sodium Chloride 0.9% bolus ; Simple Display Line:   1,000 mL, 2000 mL/hr, IV Piggyback, Once ; Ordering Provider:   Philippa Cowden B-MD; Catalog Code:   Sodium Chloride 0.9% ; Order Dt/Tm:   8/1/2022 19:50:18 EDT          pembrolizumab  :   pembrolizumab ; Status:   Future ; Ordered As Mnemonic:   pembrolizumab INF ; Simple Display Line:   200 mg, 8 mL, IV Piggyback, Day of Tx, Day 1 ; Ordering Provider:   Radha Minor; Catalog Code:   pembrolizumab ; Order Dt/Tm:   1/25/2022 21:52:29 EST ; Comment:   Cytotoxic agent, Regular tubing with Lo-Sorb 0.22 micron filter          pembrolizumab  :   pembrolizumab ; Status:   Future ; Ordered As Mnemonic:   pembrolizumab INF ;  Simple Display Line:   200 mg, 8 mL, IV Piggyback, Day of Tx, Day 1 ; Ordering Provider: RORY Luevano; Catalog Code:   pembrolizumab ; Order Dt/Tm:   1/25/2022 21:52:29 EST ; Comment:   Cytotoxic agent, Regular tubing with Lo-Sorb 0.22 micron filter          LORazepam 2 mg/mL Inj Soln 1 mL  :   LORazepam 2 mg/mL Inj Soln 1 mL ; Status:   Future ; Ordered As Mnemonic:   LORazepam ; Simple Display Line:   0.5 mg, 0.25 mL, IV Push, q6hr-Day of Tx, Day 1, PRN: other (see comment) ; Ordering Provider:   Solomon Choi; Catalog Code:   LORazepam ; Order Dt/Tm:   1/25/2022 21:52:29 EST ; Comment:   PRN anticipatory nausea/vomiting          LORazepam 2 mg/mL Inj Soln 1 mL  :   LORazepam 2 mg/mL Inj Soln 1 mL ; Status:   Future ; Ordered As Mnemonic:   LORazepam ; Simple Display Line:   0.5 mg, 0.25 mL, IV Push, q6hr-Day of Tx, Day 1, PRN: other (see comment) ; Ordering Provider:   Solomon Choi; Catalog Code:   LORazepam ; Order Dt/Tm:   1/25/2022 21:52:29 EST ; Comment:   PRN anticipatory nausea/vomiting          promethazine 25 mg/mL Inj Soln 1 mL  :   promethazine 25 mg/mL Inj Soln 1 mL ; Status:   Future ; Ordered As Mnemonic:   promethazine IV range dose - High Alert ; Simple Display Line:   25 mg, 1 mL, IV Push, q6hr-Day of Tx, Day 1, PRN: nausea/vomiting ; Ordering Provider:   Solomon Choi; Catalog Code:   promethazine ; Order Dt/Tm:   1/25/2022 21:52:28 EST ; Comment:   if nausea unrelieved by ondansetron (zofran) give promethazine (phenergan).  WHEN ADMINISTERING IV, ALWAYS DILUTE IN 9 ML NORMAL SALINE FOR A TOTAL VOLUME OF 10ML - [FINAL CONCENTRATION OF 2.5 MG/ML] AND PUSH OVER 1 MINUTE **DO NOT DILUTE FOR IM ADMINISTRATION**   \"THIS MEDICATION IS ASSOCIATED WITH AN INCREASED RISK OF FALLS\"          promethazine 25 mg/mL Inj Soln 1 mL  :   promethazine 25 mg/mL Inj Soln 1 mL ; Status:   Future ; Ordered As Mnemonic:   promethazine IV range dose - High Alert ; Simple Display Line:   25 mg, 1 mL, IV Push, q6hr-Day of Tx, Day 1, PRN: nausea/vomiting ; Ordering Provider:   Marin Cho; Catalog Code:   promethazine ; Order Dt/Tm:   1/25/2022 21:52:28 EST ; Comment:   if nausea unrelieved by ondansetron (zofran) give promethazine (phenergan). WHEN ADMINISTERING IV, ALWAYS DILUTE IN 9 ML NORMAL SALINE FOR A TOTAL VOLUME OF 10ML - [FINAL CONCENTRATION OF 2.5 MG/ML] AND PUSH OVER 1 MINUTE **DO NOT DILUTE FOR IM ADMINISTRATION**   \"THIS MEDICATION IS ASSOCIATED WITH AN INCREASED RISK OF FALLS\"          Zero Time Placeholder  :   Zero Time Placeholder ; Status:   Future ; Ordered As Mnemonic:   Zero Time Placeholder ; Simple Display Line:   1 mL, Day of Tx, Day 1 ; Ordering Provider:   Marin Cho; Catalog Code:   Zero Time Placeholder ; Order Dt/Tm:   1/25/2022 21:52:28 EST          Zero Time Placeholder  :   Zero Time Placeholder ; Status:   Future ; Ordered As Mnemonic:   Zero Time Placeholder ; Simple Display Line:   1 mL, Day of Tx, Day 1 ; Ordering Provider:   Marin Cho; Catalog Code:   Zero Time Placeholder ; Order Dt/Tm:   1/25/2022 21:52:28 EST            Home Meds    docusate-senna  :   docusate-senna ; Status:   Documented ; Ordered As Mnemonic:   docusate-senna 50 mg-8.6 mg oral tablet ; Simple Display Line:   1 tabs, Oral, BID, PRN: constipation, 0 Refill(s) ; Ordering Provider:   Shaina Mccann; Catalog Code:   docusate-senna ; Order Dt/Tm:   11/18/2021 07:57:28 EST          famotidine  :   famotidine ; Status:   Documented ; Ordered As Mnemonic:   famotidine 20 mg oral tablet ; Simple Display Line:   20 mg, 1 tabs, Oral, Daily, 0 Refill(s) ; Ordering Provider:   Shaina Mccann; Catalog Code:   famotidine ; Order Dt/Tm:   11/18/2021 07:57:31 EST          HYDROcodone-acetaminophen  :   HYDROcodone-acetaminophen ; Status:   Documented ; Ordered As Mnemonic:   HYDROcodone-acetaminophen 5 mg-325 mg oral tablet ; Simple Display Line:   1 tabs, Oral, q6hr, PRN: moderate pain (4-7), 0 Refill(s) ;  Ordering Provider:   Jacob Church, SACHA GAMINO; Catalog Code:   HYDROcodone-acetaminophen ; Order Dt/Tm:   11/18/2021 07:57:37 EST          lacosamide  :   lacosamide ; Status:   Documented ; Ordered As Mnemonic:   Vimpat 100 mg oral tablet ; Simple Display Line:   100 mg, 1 tabs, Oral, BID, 0 Refill(s) ; Ordering Provider:   Belinda Thomason; Catalog Code:   lacosamide ; Order Dt/Tm:   11/18/2021 07:57:44 EST          acetaminophen  :   acetaminophen ; Status:   Documented ; Ordered As Mnemonic:   acetaminophen 325 mg oral tablet ; Simple Display Line:   325 mg, 1 tabs, Oral, q6hr, not to exceed 4000 mg/day, PRN: mild pain (1-3), 0 Refill(s) ; Ordering Provider:   Belinda Thomason; Catalog Code:   acetaminophen ; Order Dt/Tm:   11/18/2021 07:56:41 EST          dexAMETHasone  :   dexAMETHasone ; Status:   Documented ; Ordered As Mnemonic:   dexAMETHasone 4 mg oral tablet ; Simple Display Line:   4 mg, 1 tabs, Oral, q6hr, follow steroid taper in chart, take famotidine daily while taking steroids. Take steroids with food. , 0 Refill(s) ; Ordering Provider:   Belinda Thomason; Catalog Code:   dexAMETHasone ; Order Dt/Tm:   11/18/2021 07:56:52 EST          levETIRAcetam  :   levETIRAcetam ; Status:   Documented ; Ordered As Mnemonic:   Keppra 1000 mg oral tablet ; Simple Display Line:   2,000 mg, 2 tabs, Oral, BID, 0 Refill(s) ;  Ordering Provider:   Belinda Thomason; Catalog Code:   levETIRAcetam ; Order Dt/Tm:   11/12/2021 13:37:57 EST